# Patient Record
Sex: MALE | Race: BLACK OR AFRICAN AMERICAN | Employment: UNEMPLOYED | URBAN - METROPOLITAN AREA
[De-identification: names, ages, dates, MRNs, and addresses within clinical notes are randomized per-mention and may not be internally consistent; named-entity substitution may affect disease eponyms.]

---

## 2024-04-20 ENCOUNTER — HOSPITAL ENCOUNTER (EMERGENCY)
Age: 54
Discharge: HOME | End: 2024-04-20

## 2024-04-22 ENCOUNTER — HOSPITAL ENCOUNTER (EMERGENCY)
Age: 54
Discharge: HOME | End: 2024-04-22
Attending: EMERGENCY MEDICINE
Payer: MEDICARE

## 2024-04-22 VITALS
HEART RATE: 70 BPM | RESPIRATION RATE: 10 BRPM | BODY MASS INDEX: 26.38 KG/M2 | OXYGEN SATURATION: 97 % | TEMPERATURE: 98.4 F | HEIGHT: 69 IN | SYSTOLIC BLOOD PRESSURE: 137 MMHG | DIASTOLIC BLOOD PRESSURE: 86 MMHG | WEIGHT: 178.1 LBS

## 2024-04-22 DIAGNOSIS — Z20.2 STD EXPOSURE: Primary | ICD-10-CM

## 2024-04-22 PROCEDURE — 87491 CHLMYD TRACH DNA AMP PROBE: CPT

## 2024-04-22 PROCEDURE — 87591 N.GONORRHOEAE DNA AMP PROB: CPT

## 2024-04-22 PROCEDURE — 6370000000 HC RX 637 (ALT 250 FOR IP): Performed by: EMERGENCY MEDICINE

## 2024-04-22 PROCEDURE — 99283 EMERGENCY DEPT VISIT LOW MDM: CPT

## 2024-04-22 RX ORDER — METRONIDAZOLE 500 MG/1
2000 TABLET ORAL ONCE
Status: COMPLETED | OUTPATIENT
Start: 2024-04-22 | End: 2024-04-22

## 2024-04-22 RX ADMIN — METRONIDAZOLE 2000 MG: 500 TABLET ORAL at 12:07

## 2024-04-22 NOTE — ED PROVIDER NOTES
CC:   Chief Complaint   Patient presents with    Exposure to STD        HPI:  Jean Pierre is a 53 y.o. male who presents emergency department with a chief complaint of STD exposure.  His sexual partner has recently tested positive for trichomonas.  He is completely asymptomatic.  He particularly denies having penile drainage, penile lesions fevers chills groin swelling abdominal pain  History obtained via the patient    External records reviewed    ROS:  All Pertinent ROS Negative Unless otherwise stated within HPI.    VITALS:  Vitals:    04/22/24 1030   BP: 137/86   Pulse: 70   Resp: 10   Temp: 98.4 °F (36.9 °C)   SpO2: 97%        PHYSICAL EXAM:      Vital signs reviewed  General:  Patient appeared in no distress  Vitals:  Vital signs reviewed, see nurses notes  Neck:  No JVD, or lymphadenopathy.  Cardiovascular:  Regular rhythm, Normal sounds and absence of murmurs, rubs or gallops.  Lungs:  Clear to auscultation without rales, ronchi, or wheezing.  Abdomen:  Soft, unremarkable and without evidence of organomegally, masses, or abdominal aortic enlargement, No guarding.    LABS/IMAGING:  Reviewed  No orders to display        Labs Reviewed   C.TRACHOMATIS N.GONORRHOEAE DNA, URINE        MEDICATIONS ADMINISTERED:  Medications   metroNIDAZOLE (FLAGYL) tablet 2,000 mg (has no administration in time range)         MEDICAL DECISION MAKING:    Medications Ordered    Medications   metroNIDAZOLE (FLAGYL) tablet 2,000 mg (has no administration in time range)        Prescriptions Written:    New Prescriptions    No medications on file         Problems Addressed This Visit:      1.  STD exposure: Sexual partner tested positive for trichomonas.  Patient's urine was sent for GC and chlamydia to evaluate for other STDs.  He was given a dose of Flagyl 2 g p.o. to cover for trichomonas.  Safe sex instructions reviewed.          Discussed Patient with another provider(s) and Healthcare Team:     Social Determinants of Health:   Care

## 2024-04-22 NOTE — ED NOTES
Patient given d/c instructions with return verbalization. EMphasis on f/u, to return with worsening s/s Reviewed My Chart with pt.  Aware will be called if any other results are +. Ambulated to lobby with steady gait.

## 2024-04-24 LAB
C TRACH DNA UR QL NAA+PROBE: NEGATIVE
N GONORRHOEA DNA UR QL NAA+PROBE: NEGATIVE

## 2024-05-19 ENCOUNTER — HOSPITAL ENCOUNTER (EMERGENCY)
Age: 54
Discharge: HOME | End: 2024-05-19
Attending: STUDENT IN AN ORGANIZED HEALTH CARE EDUCATION/TRAINING PROGRAM
Payer: MEDICARE

## 2024-05-19 VITALS
OXYGEN SATURATION: 99 % | WEIGHT: 180.7 LBS | RESPIRATION RATE: 16 BRPM | DIASTOLIC BLOOD PRESSURE: 94 MMHG | TEMPERATURE: 98 F | HEIGHT: 68 IN | SYSTOLIC BLOOD PRESSURE: 153 MMHG | HEART RATE: 90 BPM | BODY MASS INDEX: 27.38 KG/M2

## 2024-05-19 DIAGNOSIS — S41.112A LACERATION OF LEFT UPPER EXTREMITY, INITIAL ENCOUNTER: Primary | ICD-10-CM

## 2024-05-19 PROCEDURE — 90715 TDAP VACCINE 7 YRS/> IM: CPT | Performed by: STUDENT IN AN ORGANIZED HEALTH CARE EDUCATION/TRAINING PROGRAM

## 2024-05-19 PROCEDURE — 99284 EMERGENCY DEPT VISIT MOD MDM: CPT

## 2024-05-19 PROCEDURE — 90471 IMMUNIZATION ADMIN: CPT | Performed by: STUDENT IN AN ORGANIZED HEALTH CARE EDUCATION/TRAINING PROGRAM

## 2024-05-19 PROCEDURE — 6360000002 HC RX W HCPCS: Performed by: STUDENT IN AN ORGANIZED HEALTH CARE EDUCATION/TRAINING PROGRAM

## 2024-05-19 RX ADMIN — TETANUS TOXOID, REDUCED DIPHTHERIA TOXOID AND ACELLULAR PERTUSSIS VACCINE, ADSORBED 0.5 ML: 5; 2.5; 8; 8; 2.5 SUSPENSION INTRAMUSCULAR at 12:34

## 2024-05-19 ASSESSMENT — PAIN - FUNCTIONAL ASSESSMENT: PAIN_FUNCTIONAL_ASSESSMENT: NONE - DENIES PAIN

## 2024-05-19 NOTE — DISCHARGE INSTRUCTIONS
You were evaluated in the emergency department for evaluation of arm wounds. Assessments and testing completed during your visit were reassuring and at this time there is no indication for further testing, treatment or admission to the hospital. Given this it is appropriate to discharge you from the emergency department. At the time of discharge we discussed the following:    Please wash the wounds once daily with soap and water after which I asked that you cover the wounds with a thick layer of triple antibiotic ointment.  You may purchase additional ointment including Neosporin from the pharmacy.  Please cover then with a thick bandage until the wound is fully healed.  If you have any concerns for complications including increasing pain redness swelling or drainage please return to the emergency department for evaluation.  Please note we updated your tetanus immunization today.    Please note that sometimes it is difficult to diagnose a medical condition early in the disease process before the disease is fully manifest. Because of this, should you develop any new or worsening symptoms, you may return at any time to the emergency department for another evaluation. If available you are also recommended to review this visit with your primary care physician or other medical provider in the next 7 days. Thank you for allowing us to care for you today.
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
